# Patient Record
Sex: MALE | Race: WHITE | NOT HISPANIC OR LATINO | Employment: FULL TIME | URBAN - METROPOLITAN AREA
[De-identification: names, ages, dates, MRNs, and addresses within clinical notes are randomized per-mention and may not be internally consistent; named-entity substitution may affect disease eponyms.]

---

## 2017-02-06 ENCOUNTER — OFFICE VISIT (OUTPATIENT)
Dept: PULMONOLOGY | Facility: CLINIC | Age: 57
End: 2017-02-06

## 2017-02-06 VITALS
HEART RATE: 84 BPM | TEMPERATURE: 98.2 F | WEIGHT: 226.8 LBS | RESPIRATION RATE: 16 BRPM | OXYGEN SATURATION: 98 % | DIASTOLIC BLOOD PRESSURE: 78 MMHG | SYSTOLIC BLOOD PRESSURE: 118 MMHG | HEIGHT: 73 IN | BODY MASS INDEX: 30.06 KG/M2

## 2017-02-06 DIAGNOSIS — J45.909 UNCOMPLICATED ASTHMA, UNSPECIFIED ASTHMA SEVERITY: Primary | ICD-10-CM

## 2017-02-06 DIAGNOSIS — Z78.9 NONSMOKER: ICD-10-CM

## 2017-02-06 PROBLEM — J45.20 MILD INTERMITTENT ASTHMA: Status: ACTIVE | Noted: 2017-02-06

## 2017-02-06 PROCEDURE — 94375 RESPIRATORY FLOW VOLUME LOOP: CPT | Performed by: NURSE PRACTITIONER

## 2017-02-06 PROCEDURE — 99204 OFFICE O/P NEW MOD 45 MIN: CPT | Performed by: NURSE PRACTITIONER

## 2017-02-06 PROCEDURE — 94729 DIFFUSING CAPACITY: CPT | Performed by: NURSE PRACTITIONER

## 2017-02-06 PROCEDURE — 94726 PLETHYSMOGRAPHY LUNG VOLUMES: CPT | Performed by: NURSE PRACTITIONER

## 2017-02-06 RX ORDER — FLUTICASONE PROPIONATE 220 UG/1
2 AEROSOL, METERED RESPIRATORY (INHALATION)
Qty: 1 INHALER | Refills: 3 | Status: SHIPPED | OUTPATIENT
Start: 2017-02-06 | End: 2017-03-08

## 2017-02-06 NOTE — PROGRESS NOTES
[facility] Pulmonary Follow up    CHIEF COMPLAINT    History of asthma cough and congestion    HISTORY OF PRESENT ILLNESS    Mohamud Choe is a 56 y.o.male here today for referral for cough and congestion.  He was last seen in the office about 4 years ago by Dr. Lopez.  He is a history of intermittent asthma, and was off of all of the inhalers when he was last here.  He was diagnosed with asthma late in life around 1979, because of all the frequent bronchitis he had he was finally referred and saw Dr. Lopez, she started him on Flovent 220 which he used 2 puffs daily, he was eventually controlled on just Flovent 44 HFA 1 puff daily.    He stopped using any inhalers until early 2016 when he developed symptoms of cough congestion.  Chest tightness.  He took a Z-Héctor, had another round of antibiotic but did not improve until he started using pro air.  He had an old prescription of Flovent 44 and over the summer he started himself back on the inhaler.  This did help him.  For that reason he is back here today.    He denies sinus drainage or any significant history of allergies.  He denies a history of reflux or aspiration.  He has Pro Air that he started using early last year and this works very well for him especially when he has chest tightness and shortness of breath.  It only lasts a couple hours.  He is currently using 1-2 puffs 3-4 times a day.  Again it still helps but he is here for Flovent prescription.    He denies fevers chills or night sweats.  He has a good appetite as well as been stable.  He has no hospitalizations or emergency room visits.  He doesn't like to take the flu shot.  He does follow along closely with his PCP.  He has no history of sleep apnea.  He has remained active    He is a lifelong nonsmoker.  He started having trouble with cough and congestion, February and March of last year.        Patient Active Problem List   Diagnosis   • Mild intermittent asthma   • Nonsmoker  "      Allergies no known allergies    Current Outpatient Prescriptions:   •  aspirin 81 MG tablet, Take 81 mg by mouth Daily., Disp: , Rfl:   •  PROAIR  (90 BASE) MCG/ACT inhaler, INL 2 PFS PO QID PRN, Disp: , Rfl: 3  •  fluticasone (FLOVENT HFA) 220 MCG/ACT inhaler, Inhale 2 puffs 2 (Two) Times a Day for 30 days., Disp: 1 inhaler, Rfl: 3  MEDICATION LIST AND ALLERGIES REVIEWED.    Social History   Substance Use Topics   • Smoking status: Never Smoker   • Smokeless tobacco: Not on file   • Alcohol use Yes      Comment: [place] or beer, one drink a week       FAMILY AND SOCIAL HISTORY REVIEWED.    Review of Systems   Constitutional: Negative for chills, fatigue and fever.   HENT: Negative for nosebleeds, postnasal drip and sore throat.    Eyes: Negative.  Negative for pain and redness.   Respiratory: Positive for chest tightness and shortness of breath. Negative for wheezing and stridor.    Cardiovascular: Negative for chest pain and palpitations.   Gastrointestinal: Negative for abdominal distention, abdominal pain and nausea.   Endocrine: Negative for cold intolerance and heat intolerance.   Genitourinary: Negative for dysuria, flank pain and hematuria.   Musculoskeletal: Negative for arthralgias and myalgias.   Skin: Negative for color change and rash.   Neurological: Negative for dizziness, syncope and numbness.   Hematological: Negative for adenopathy. Does not bruise/bleed easily.   Psychiatric/Behavioral: Negative for agitation, behavioral problems, confusion and sleep disturbance.   .    Visit Vitals   • /78   • Pulse 84   • Temp 98.2 °F (36.8 °C)   • Resp 16   • Ht 73\" (185.4 cm)   • Wt 226 lb 12.8 oz (103 kg)   • SpO2 98%  Comment: RA   • BMI 29.92 kg/m2     Physical Exam   Constitutional: He is oriented to person, place, and time. Vital signs are normal. He appears well-developed. He is cooperative.  Non-toxic appearance. No distress.   HENT:   Head: Normocephalic. Head is without abrasion and " without contusion.   Mouth/Throat: Oropharynx is clear and moist and mucous membranes are normal.   Eyes: Conjunctivae are normal. Pupils are equal, round, and reactive to light.   Neck: Trachea normal and normal range of motion. No JVD present. No tracheal deviation present. No thyroid mass and no thyromegaly present.   Cardiovascular: Normal rate, regular rhythm and normal heart sounds.  Exam reveals no gallop.    No murmur heard.  No edema cyanosis or calf tenderness   Pulmonary/Chest: Effort normal. No stridor. No respiratory distress. He has no wheezes. He has no rales.   Clear and equal   Abdominal: Soft. He exhibits no ascites. There is no hepatosplenomegaly. There is no tenderness.   Lymphadenopathy:        Head (right side): No submandibular adenopathy present.        Head (left side): No submandibular adenopathy present.     He has no cervical adenopathy.        Right: No supraclavicular adenopathy present.        Left: No supraclavicular adenopathy present.   Neurological: He is alert and oriented to person, place, and time. He has normal strength.   Skin: Skin is warm and dry. No abrasion and no rash noted.   Psychiatric: He has a normal mood and affect. His speech is normal and behavior is normal. Cognition and memory are normal.       RESULTS    Chest x-ray PA and lateral obtained in the office today reveals no acute infiltrates or effusions, costophrenic angles sharp, no other films to compare.    Pulmicort function testing reveals no restriction, total lung capacity normal at 94%.  Diffusion normal at 106%.  Minute ventilation normal, no obstruction, ratio is 76%.  FEV1 3.74 L, 91%.  FVC 4.95 L, 92%.    PROBLEM LIST    Problem List Items Addressed This Visit        Other    Nonsmoker      Other Visit Diagnoses     Uncomplicated asthma, unspecified asthma severity    -  Primary    Relevant Medications    PROAIR  (90 BASE) MCG/ACT inhaler    fluticasone (FLOVENT HFA) 220 MCG/ACT inhaler    Other  Relevant Orders    XR Chest PA & Lateral (Completed)    Pulmonary Function Test (Completed)            DISCUSSION    A prescription was sent for Flovent 220, 2 puffs twice a day for couple weeks to get his symptoms under control, then when he notices he is improved and has been able to decrease pro air use Cetaphil the lower doses of Flovent, I gave him prescriptions for those as well.    Follow-up in 6 months, The patient was told to call and given extensions 114 if needed for an earlier visit if problems arise prior to the scheduled followup.      Marbella Enrique, APRN  02/06/20179:18 AM  Electronically signed     Please note that portions of this note were completed with a voice recognition program. Efforts were made to edit the dictations, but occasionally words are mistranscribed.      CC: Umang Lopez MD

## 2017-10-11 ENCOUNTER — OFFICE VISIT (OUTPATIENT)
Dept: PULMONOLOGY | Facility: CLINIC | Age: 57
End: 2017-10-11

## 2017-10-11 VITALS
RESPIRATION RATE: 16 BRPM | HEIGHT: 73 IN | DIASTOLIC BLOOD PRESSURE: 80 MMHG | SYSTOLIC BLOOD PRESSURE: 125 MMHG | OXYGEN SATURATION: 96 % | BODY MASS INDEX: 30.17 KG/M2 | WEIGHT: 227.6 LBS | HEART RATE: 70 BPM | TEMPERATURE: 97.8 F

## 2017-10-11 DIAGNOSIS — J45.20 MILD INTERMITTENT ASTHMA WITHOUT COMPLICATION: ICD-10-CM

## 2017-10-11 DIAGNOSIS — Z78.9 NONSMOKER: Primary | ICD-10-CM

## 2017-10-11 PROCEDURE — 99213 OFFICE O/P EST LOW 20 MIN: CPT | Performed by: NURSE PRACTITIONER

## 2017-10-11 RX ORDER — FLUTICASONE PROPIONATE 44 MCG
AEROSOL WITH ADAPTER (GRAM) INHALATION
Refills: 11 | COMMUNITY
Start: 2017-08-23 | End: 2019-05-21 | Stop reason: SDUPTHER

## 2017-10-11 NOTE — PROGRESS NOTES
[facility] Pulmonary Follow up    CHIEF COMPLAINT    FU asthma    HISTORY OF PRESENT ILLNESS    Mohamud Choe is a 57 y.o.male here today for asthma. He was last here about 6 months ago. He stopped using any inhalers until early 2016 when he developed symptoms of cough congestion, and chest tightness.  He took a Z-Héctor, had another round of antibiotic but did not improve until he started using pro air.  He had an old prescription of Flovent 44 and over the summer he started himself back on the inhaler.  This did help him.      He denies sinus drainage or any significant history of allergies.  He denies a history of reflux or aspiration.  He has Pro Air that he started using early last year and this works very well for him especially when he has chest tightness and shortness of breath. He has not had to use it, denies sob, no cough, and remains on flovent 44 mcg; he will use it 1-2 puffs 1-2 times per day.     He denies fevers chills or night sweats.  He has a good appetite as well as been stable.  He has no hospitalizations or emergency room visits.  He doesn't like to take the flu shot.  He does follow along closely with his PCP.  He has no history of sleep apnea.  He has remained active    He is a lifelong nonsmoker.         Patient Active Problem List   Diagnosis   • Mild intermittent asthma   • Nonsmoker       No Known Allergies    Current Outpatient Prescriptions:   •  aspirin 81 MG tablet, Take 81 mg by mouth Daily., Disp: , Rfl:   •  FLOVENT HFA 44 MCG/ACT inhaler, INHALE 2 PUFFS PO QAM AND  2 PUFFS PO QPM, Disp: , Rfl: 11  •  PROAIR  (90 BASE) MCG/ACT inhaler, INL 2 PFS PO QID PRN, Disp: , Rfl: 3  MEDICATION LIST AND ALLERGIES REVIEWED.    Social History   Substance Use Topics   • Smoking status: Never Smoker   • Smokeless tobacco: Not on file   • Alcohol use Yes      Comment: [place] or beer, one drink a week       FAMILY AND SOCIAL HISTORY REVIEWED.    Review of Systems   Constitutional: Negative for  "activity change, chills, fatigue and fever.   HENT: Negative for hearing loss, nosebleeds, postnasal drip and sneezing.    Respiratory: Negative for apnea, cough, chest tightness, shortness of breath, wheezing and stridor.    Cardiovascular: Negative for chest pain, palpitations and leg swelling.        NO INCREASE IN MILD BLE EDEMA; NO CALF TENDERNESS    Gastrointestinal: Negative for abdominal pain, diarrhea and nausea.   Neurological: Negative for dizziness, syncope and light-headedness.   .    /80  Pulse 70  Temp 97.8 °F (36.6 °C)  Resp 16  Ht 73\" (185.4 cm)  Wt 227 lb 9.6 oz (103 kg)  SpO2 96% Comment: RA  BMI 30.03 kg/m2  Physical Exam   Constitutional: He is oriented to person, place, and time. He appears well-developed. No distress.   HENT:   Head: Normocephalic.   Eyes: Pupils are equal, round, and reactive to light.   Neck: No JVD present. No thyromegaly present.   Cardiovascular: Normal rate, regular rhythm and normal heart sounds.  Exam reveals no friction rub.    No BLE edema; no venous cords or calf tenderness.    Pulmonary/Chest: Effort normal. No stridor. No respiratory distress. He has no wheezes. He has no rales. He exhibits no tenderness.   Lymphadenopathy:     He has no cervical adenopathy.   Neurological: He is alert and oriented to person, place, and time.   Skin: He is not diaphoretic.   Psychiatric: He has a normal mood and affect. His behavior is normal. Thought content normal.         PROBLEM LIST    Problem List Items Addressed This Visit        Respiratory    Mild intermittent asthma    Overview     Vital capacity is 4.99 or 91%, FEV1 os 4.07 pr 96%, ratio is 82%. This is normal study 03/04/13         Relevant Medications    FLOVENT HFA 44 MCG/ACT inhaler       Other    Nonsmoker - Primary            DISCUSSION    He doesn't want the flu shot and never takes them    I gave him a written prescription for Flovent 44, 2 puffs a.m. and p.m. rinse and spit after use and he'll " remain on this for now she is done well with the lower dose    Follow-up in one year with spirometry, he was told to call us if he needs to be seen prior to that time    Marbella Enrique, APRN  10/11/70113:24 AM  Electronically signed     Please note that portions of this note were completed with a voice recognition program. Efforts were made to edit the dictations, but occasionally words are mistranscribed.      CC: Umang Lopez MD

## 2019-05-21 ENCOUNTER — OFFICE VISIT (OUTPATIENT)
Dept: PULMONOLOGY | Facility: CLINIC | Age: 59
End: 2019-05-21

## 2019-05-21 VITALS
SYSTOLIC BLOOD PRESSURE: 142 MMHG | WEIGHT: 225.6 LBS | HEIGHT: 73 IN | DIASTOLIC BLOOD PRESSURE: 89 MMHG | OXYGEN SATURATION: 96 % | BODY MASS INDEX: 29.9 KG/M2 | TEMPERATURE: 98.4 F | HEART RATE: 59 BPM

## 2019-05-21 DIAGNOSIS — Z78.9 NONSMOKER: ICD-10-CM

## 2019-05-21 DIAGNOSIS — J45.20 MILD INTERMITTENT ASTHMA WITHOUT COMPLICATION: Primary | ICD-10-CM

## 2019-05-21 PROCEDURE — 94729 DIFFUSING CAPACITY: CPT | Performed by: NURSE PRACTITIONER

## 2019-05-21 PROCEDURE — 94375 RESPIRATORY FLOW VOLUME LOOP: CPT | Performed by: NURSE PRACTITIONER

## 2019-05-21 PROCEDURE — 99213 OFFICE O/P EST LOW 20 MIN: CPT | Performed by: NURSE PRACTITIONER

## 2019-05-21 PROCEDURE — 94726 PLETHYSMOGRAPHY LUNG VOLUMES: CPT | Performed by: NURSE PRACTITIONER

## 2019-05-21 RX ORDER — FLUTICASONE PROPIONATE 44 MCG
2 AEROSOL WITH ADAPTER (GRAM) INHALATION
Qty: 10.6 G | Refills: 11 | Status: SHIPPED | OUTPATIENT
Start: 2019-05-21 | End: 2020-10-01 | Stop reason: SDUPTHER

## 2019-05-21 NOTE — PROGRESS NOTES
[facility] Pulmonary Follow up    CHIEF COMPLAINT    Mild intermittent asthma    HISTORY OF PRESENT ILLNESS    Mohamud Choe is a 58 y.o.male here today for a yearly checkup of his mild intermittent asthma.  He denies any respiratory illnesses or exacerbations since his last appointment.  He states that he has been doing pretty well from a respiratory standpoint.    He continues to use Flovent 2 puffs every morning.  He has never used his rescue inhaler.  He does have occasional wheezes with activity.  He denies any shortness of breath with activity.    He denies fever, chills, sputum production, hemoptysis, night sweats, weight loss, chest pain or palpitations.  He denies lower extremity edema.  He denies any difficulties with allergies or sinus symptoms.  He denies reflux symptoms.  He denies any difficulty sleeping.  He states he feels well rested in the mornings.    He is a lifetime non-smoker.  He denies any changes in his medical history since his last appointment.    Patient Active Problem List   Diagnosis   • Mild intermittent asthma   • Nonsmoker       No Known Allergies    Current Outpatient Medications:   •  aspirin 81 MG tablet, Take 81 mg by mouth Daily., Disp: , Rfl:   •  FLOVENT HFA 44 MCG/ACT inhaler, Inhale 2 puffs 2 (Two) Times a Day., Disp: 10.6 g, Rfl: 11  •  PROAIR  (90 BASE) MCG/ACT inhaler, INL 2 PFS PO QID PRN, Disp: , Rfl: 3  MEDICATION LIST AND ALLERGIES REVIEWED.    Social History     Tobacco Use   • Smoking status: Never Smoker   Substance Use Topics   • Alcohol use: Yes     Comment: [place] or beer, one drink a week   • Drug use: No       FAMILY AND SOCIAL HISTORY REVIEWED.    Review of Systems   Constitutional: Negative for activity change, appetite change, fatigue, fever and unexpected weight change.   HENT: Negative for congestion, postnasal drip, rhinorrhea, sinus pressure, sore throat and voice change.    Eyes: Negative for visual disturbance.   Respiratory: Negative for  "cough, chest tightness, shortness of breath and wheezing.    Cardiovascular: Negative for chest pain, palpitations and leg swelling.   Gastrointestinal: Negative for abdominal distention, abdominal pain, nausea and vomiting.   Endocrine: Negative for cold intolerance and heat intolerance.   Genitourinary: Negative for difficulty urinating and urgency.   Musculoskeletal: Positive for arthralgias. Negative for back pain and neck pain.   Skin: Negative for color change and pallor.   Allergic/Immunologic: Negative for environmental allergies and food allergies.   Neurological: Negative for dizziness, syncope, weakness and light-headedness.   Hematological: Negative for adenopathy. Does not bruise/bleed easily.   Psychiatric/Behavioral: Negative for agitation and behavioral problems.   .    /89 (BP Location: Right arm, Patient Position: Sitting)   Pulse 59   Temp 98.4 °F (36.9 °C)   Ht 185.4 cm (73\")   Wt 102 kg (225 lb 9.6 oz)   SpO2 96% Comment: resting at room air  BMI 29.76 kg/m²        There is no immunization history on file for this patient.    Physical Exam   Constitutional: He is oriented to person, place, and time. He appears well-developed and well-nourished.   HENT:   Head: Normocephalic and atraumatic.   Eyes: Pupils are equal, round, and reactive to light.   Neck: Normal range of motion. Neck supple. No thyromegaly present.   Cardiovascular: Normal rate, regular rhythm, normal heart sounds and intact distal pulses. Exam reveals no gallop and no friction rub.   No murmur heard.  Pulmonary/Chest: Effort normal and breath sounds normal. No respiratory distress. He has no wheezes. He has no rales. He exhibits no tenderness.   Abdominal: Soft. Bowel sounds are normal. There is no tenderness.   Musculoskeletal: Normal range of motion.   Lymphadenopathy:     He has no cervical adenopathy.   Neurological: He is alert and oriented to person, place, and time.   Skin: Skin is warm and dry. Capillary refill " takes less than 2 seconds. He is not diaphoretic.   Psychiatric: He has a normal mood and affect. His behavior is normal.   Nursing note and vitals reviewed.        RESULTS    PFTS in the office today, read by me.    Spirometry Interpretation 05/21/19:    1. No airway obstruction as the FEV1 ratio >= 0.7.  2. The maximum voluntary ventilation (MVV) is normal.   3. TLC 6.31 85% predicted, no restriction noted  4. Normal diffusion at 101% predicted    Chest PA/lateral: Reviewed by me in the office today, appears to have no acute pulmonary process similar when compared to previous exam, official report pending.    PROBLEM LIST    Problem List Items Addressed This Visit        Respiratory    Mild intermittent asthma - Primary    Overview     Vital capacity is 4.99 or 91%, FEV1 os 4.07 pr 96%, ratio is 82%. This is normal study 03/04/13         Relevant Medications    FLOVENT HFA 44 MCG/ACT inhaler    Other Relevant Orders    Pulmonary Function Test (Completed)    XR Chest PA & Lateral       Other    Nonsmoker            DISCUSSION    Mr. Choe was here for follow-up of his mild intermittent asthma.  We reviewed his PFTs in detail today and he continues to have normal PFTs.  He will continue his Flovent 2 puffs daily for his mild intermittent asthma.  I sent in refills for this today.  I did encourage him to use his rescue inhaler if needed for shortness of breath.    We also reviewed his chest x-ray today in the office does not appear to have any acute process going on currently.    He seems to be doing well from a respiratory standpoint.  He has not had any exacerbations or illnesses in the last year.    He will follow-up yearly or sooner if his symptoms worsen.  I spent 15 minutes with the patient. I spent > 50% percent of this time counseling and discussing diagnosis, prognosis, diagnostic testing, evaluation, current status, treatment options and management.    Mesha Marc, APRN  05/21/20199:27  AM  Electronically signed     Please note that portions of this note were completed with a voice recognition program. Efforts were made to edit the dictations, but occasionally words are mistranscribed.      CC: Umang Lopez MD

## 2020-10-01 ENCOUNTER — OFFICE VISIT (OUTPATIENT)
Dept: PULMONOLOGY | Facility: CLINIC | Age: 60
End: 2020-10-01

## 2020-10-01 VITALS
BODY MASS INDEX: 29.29 KG/M2 | WEIGHT: 221 LBS | OXYGEN SATURATION: 96 % | TEMPERATURE: 97.5 F | SYSTOLIC BLOOD PRESSURE: 150 MMHG | DIASTOLIC BLOOD PRESSURE: 90 MMHG | HEIGHT: 73 IN | HEART RATE: 67 BPM

## 2020-10-01 DIAGNOSIS — J45.20 MILD INTERMITTENT ASTHMA WITHOUT COMPLICATION: ICD-10-CM

## 2020-10-01 DIAGNOSIS — Z78.9 NONSMOKER: ICD-10-CM

## 2020-10-01 DIAGNOSIS — J45.20 MILD INTERMITTENT ASTHMA, UNSPECIFIED WHETHER COMPLICATED: Primary | ICD-10-CM

## 2020-10-01 PROCEDURE — 99213 OFFICE O/P EST LOW 20 MIN: CPT | Performed by: NURSE PRACTITIONER

## 2020-10-01 RX ORDER — FLUTICASONE PROPIONATE 44 MCG
2 AEROSOL WITH ADAPTER (GRAM) INHALATION
Qty: 10.6 G | Refills: 11 | Status: SHIPPED | OUTPATIENT
Start: 2020-10-01 | End: 2021-10-04 | Stop reason: SDUPTHER

## 2020-10-01 NOTE — PROGRESS NOTES
[facility] Pulmonary Follow up    CHIEF COMPLAINT    Mild intermittent asthma    HISTORY OF PRESENT ILLNESS    Mohamud Choe is a 60 y.o.male here today for follow-up of his asthma.  He was last seen 1 year ago by me.  He denies any respiratory illnesses or exacerbations since his last appointment.    He states for the most part he has been doing fairly well from a pulmonary standpoint.  He continues to use his Flovent 2 puffs twice a day.  He denies any wheezing or shortness of breath.  He will have a's mild shortness of breath when climbing hills or heavy exertion but does recover quickly at rest.    He has a pro-air rescue inhaler that he likes to keep on hand but does not use that very frequently.  He thinks his inhaler is .    He denies a fever, chills, sputum production, hemoptysis, night sweats, weight loss, chest pain or palpitations.  He denies any lower extremity edema or calf tenderness.  He denies any sinus or allergy symptoms.  He denies reflux symptoms.    He refuses to receive the influenza vaccination.    He is a lifetime non-smoker.    Patient Active Problem List   Diagnosis   • Mild intermittent asthma   • Nonsmoker       No Known Allergies    Current Outpatient Medications:   •  aspirin 81 MG tablet, Take 81 mg by mouth Daily., Disp: , Rfl:   •  Flovent HFA 44 MCG/ACT inhaler, Inhale 2 puffs 2 (Two) Times a Day., Disp: 10.6 g, Rfl: 11  •  ProAir  (90 Base) MCG/ACT inhaler, Inhale 2 puffs Every 4 (Four) Hours As Needed for Wheezing or Shortness of Air., Disp: 6.7 g, Rfl: 3  MEDICATION LIST AND ALLERGIES REVIEWED.    Social History     Tobacco Use   • Smoking status: Never Smoker   Substance Use Topics   • Alcohol use: Yes     Comment: [place] or beer, one drink a week   • Drug use: No       FAMILY AND SOCIAL HISTORY REVIEWED.    Review of Systems   Constitutional: Negative for activity change, appetite change, fatigue, fever and unexpected weight change.   HENT: Negative for  "congestion, postnasal drip, rhinorrhea, sinus pressure, sore throat and voice change.    Eyes: Negative for visual disturbance.   Respiratory: Negative for cough, chest tightness, shortness of breath and wheezing.    Cardiovascular: Negative for chest pain, palpitations and leg swelling.   Gastrointestinal: Negative for abdominal distention, abdominal pain, nausea and vomiting.   Endocrine: Negative for cold intolerance and heat intolerance.   Genitourinary: Negative for difficulty urinating and urgency.   Musculoskeletal: Negative for arthralgias, back pain and neck pain.   Skin: Negative for color change and pallor.   Allergic/Immunologic: Negative for environmental allergies and food allergies.   Neurological: Negative for dizziness, syncope, weakness and light-headedness.   Hematological: Negative for adenopathy. Does not bruise/bleed easily.   Psychiatric/Behavioral: Negative for agitation and behavioral problems.   .    /90   Pulse 67   Temp 97.5 °F (36.4 °C)   Ht 185.4 cm (73\")   Wt 100 kg (221 lb)   SpO2 96% Comment: resting at eroom air  BMI 29.16 kg/m²       There is no immunization history on file for this patient.    Physical Exam  Vitals signs and nursing note reviewed.   Constitutional:       Appearance: He is well-developed. He is not diaphoretic.   HENT:      Head: Normocephalic and atraumatic.   Eyes:      Pupils: Pupils are equal, round, and reactive to light.   Neck:      Musculoskeletal: Normal range of motion and neck supple.      Thyroid: No thyromegaly.   Cardiovascular:      Rate and Rhythm: Normal rate and regular rhythm.      Heart sounds: Normal heart sounds. No murmur. No friction rub. No gallop.    Pulmonary:      Effort: Pulmonary effort is normal. No respiratory distress.      Breath sounds: Normal breath sounds. No wheezing or rales.   Chest:      Chest wall: No tenderness.   Abdominal:      General: Bowel sounds are normal.      Palpations: Abdomen is soft.      " Tenderness: There is no abdominal tenderness.   Musculoskeletal: Normal range of motion.         General: No swelling.   Lymphadenopathy:      Cervical: No cervical adenopathy.   Skin:     General: Skin is warm and dry.      Capillary Refill: Capillary refill takes less than 2 seconds.   Neurological:      Mental Status: He is alert and oriented to person, place, and time.   Psychiatric:         Behavior: Behavior normal.           RESULTS    Chest PA/lateral: Official report pending    PROBLEM LIST    Problem List Items Addressed This Visit        Respiratory    Mild intermittent asthma - Primary    Overview     Vital capacity is 4.99 or 91%, FEV1 os 4.07 pr 96%, ratio is 82%. This is normal study 03/04/13         Relevant Medications    Flovent HFA 44 MCG/ACT inhaler    ProAir  (90 Base) MCG/ACT inhaler    Other Relevant Orders    XR Chest PA & Lateral       Other    Nonsmoker            DISCUSSION    Mr. Choe was here for follow-up of his mild intermittent asthma.  He refused to do Anne testing and did not want to perform PFTs today.  He has not noticed any significant change in his breathing since his last appointment 1 year ago.  He will remain on Flovent 2 puffs twice a day for his asthma.  He will continue to use the pro-air as needed for shortness of breath or wheezing.  I did send in refills to his local pharmacy.    We did review his chest x-ray in the office today and it appears to have no acute pulmonary process however the official report is pending.  I will notify the patient if there are any abnormalities.    We did discuss the influenza vaccination in the office today and he refused to receive this.    He will follow-up in 1 year or sooner if his symptoms worsen.  He will call with any additional concerns or questions.  I spent 15 minutes with the patient. I spent > 50% percent of this time counseling and discussing diagnosis, prognosis, diagnostic testing, evaluation, current status,  treatment options and management.    Mesha TIM Marc, APRN  10/01/518482:54 EDT  Electronically signed     Please note that portions of this note were completed with a voice recognition program. Efforts were made to edit the dictations, but occasionally words are mistranscribed.      CC: Umang Lopez MD

## 2021-10-04 ENCOUNTER — OFFICE VISIT (OUTPATIENT)
Dept: PULMONOLOGY | Facility: CLINIC | Age: 61
End: 2021-10-04

## 2021-10-04 VITALS
WEIGHT: 224 LBS | SYSTOLIC BLOOD PRESSURE: 138 MMHG | OXYGEN SATURATION: 95 % | BODY MASS INDEX: 29.55 KG/M2 | TEMPERATURE: 97.9 F | DIASTOLIC BLOOD PRESSURE: 82 MMHG | HEART RATE: 80 BPM

## 2021-10-04 DIAGNOSIS — J45.20 MILD INTERMITTENT ASTHMA, UNSPECIFIED WHETHER COMPLICATED: Primary | ICD-10-CM

## 2021-10-04 DIAGNOSIS — J45.20 MILD INTERMITTENT ASTHMA WITHOUT COMPLICATION: ICD-10-CM

## 2021-10-04 PROCEDURE — 99214 OFFICE O/P EST MOD 30 MIN: CPT | Performed by: NURSE PRACTITIONER

## 2021-10-04 RX ORDER — FLUTICASONE PROPIONATE 44 MCG
2 AEROSOL WITH ADAPTER (GRAM) INHALATION
Qty: 10.6 G | Refills: 11 | Status: SHIPPED | OUTPATIENT
Start: 2021-10-04 | End: 2022-09-30 | Stop reason: SDUPTHER

## 2021-10-04 NOTE — PROGRESS NOTES
[facility] Pulmonary Follow up    CHIEF COMPLAINT    Asthma    HISTORY OF PRESENT ILLNESS    Mohamud Choe is a 61 y.o.male here today for follow-up of his asthma.  He was last seen 1 year ago by me.  He denies any respiratory illnesses or exacerbations since his last appointment.  He states that he is raising his granddaughter over the last year and is very active with her.    He continues to use his Flovent 2 puffs twice a day.  He rarely uses the proair rescue inhaler.  Has minimal shortness of breath with exertion and recovers fairly quickly at rest.    He denies any sleeping difficulties.    He denies a fever, chills, sputum production, hemoptysis, night sweats, weight loss, chest pain or palpitations.  He denies any lower extremity edema or calf tenderness.  He denies any sinus or allergy symptoms.  He denies reflux symptoms.     He refuses to receive the influenza vaccination.     He is a lifetime non-smoker.    Patient Active Problem List   Diagnosis   • Mild intermittent asthma   • Nonsmoker       No Known Allergies    Current Outpatient Medications:   •  aspirin 81 MG tablet, Take 81 mg by mouth Daily., Disp: , Rfl:   •  Flovent HFA 44 MCG/ACT inhaler, Inhale 2 puffs 2 (Two) Times a Day., Disp: 10.6 g, Rfl: 11  •  ProAir  (90 Base) MCG/ACT inhaler, Inhale 2 puffs Every 4 (Four) Hours As Needed for Wheezing or Shortness of Air., Disp: 6.7 g, Rfl: 3  MEDICATION LIST AND ALLERGIES REVIEWED.    Social History     Tobacco Use   • Smoking status: Never Smoker   Substance Use Topics   • Alcohol use: Yes     Comment: [place] or beer, one drink a week   • Drug use: No       FAMILY AND SOCIAL HISTORY REVIEWED.    Review of Systems   Constitutional: Negative for activity change, appetite change, fatigue, fever and unexpected weight change.   HENT: Negative for congestion, postnasal drip, rhinorrhea, sinus pressure, sore throat and voice change.    Eyes: Negative for visual disturbance.   Respiratory: Positive  for shortness of breath. Negative for cough, chest tightness and wheezing.    Cardiovascular: Negative for chest pain, palpitations and leg swelling.   Gastrointestinal: Negative for abdominal distention, abdominal pain, nausea and vomiting.   Endocrine: Negative for cold intolerance and heat intolerance.   Genitourinary: Negative for difficulty urinating and urgency.   Musculoskeletal: Negative for arthralgias, back pain and neck pain.   Skin: Negative for color change and pallor.   Allergic/Immunologic: Negative for environmental allergies and food allergies.   Neurological: Negative for dizziness, syncope, weakness and light-headedness.   Hematological: Negative for adenopathy. Does not bruise/bleed easily.   Psychiatric/Behavioral: Negative for agitation and behavioral problems.   .    /82   Pulse 80   Temp 97.9 °F (36.6 °C)   Wt 102 kg (224 lb)   SpO2 95% Comment: resting, room air  BMI 29.55 kg/m²     Immunization History   Administered Date(s) Administered   • COVID-19 (PFIZER) 03/17/2021, 04/10/2021       Physical Exam  Vitals and nursing note reviewed.   Constitutional:       Appearance: He is well-developed. He is not diaphoretic.   HENT:      Head: Normocephalic and atraumatic.   Eyes:      Pupils: Pupils are equal, round, and reactive to light.   Neck:      Thyroid: No thyromegaly.   Cardiovascular:      Rate and Rhythm: Normal rate and regular rhythm.      Heart sounds: Normal heart sounds. No murmur heard.   No friction rub. No gallop.    Pulmonary:      Effort: Pulmonary effort is normal. No respiratory distress.      Breath sounds: Normal breath sounds. No wheezing or rales.   Chest:      Chest wall: No tenderness.   Abdominal:      General: Bowel sounds are normal.      Palpations: Abdomen is soft.      Tenderness: There is no abdominal tenderness.   Musculoskeletal:         General: No swelling. Normal range of motion.      Cervical back: Normal range of motion and neck supple.    Lymphadenopathy:      Cervical: No cervical adenopathy.   Skin:     General: Skin is warm and dry.      Capillary Refill: Capillary refill takes less than 2 seconds.   Neurological:      Mental Status: He is alert and oriented to person, place, and time.   Psychiatric:         Behavior: Behavior normal.           RESULTS    XR Chest PA & Lateral    Result Date: 10/4/2021  No acute cardiopulmonary process.  D: 10/04/2021 E:  10/04/2021       PROBLEM LIST    Problem List Items Addressed This Visit        Pulmonary and Pneumonias    Mild intermittent asthma - Primary    Overview     Vital capacity is 4.99 or 91%, FEV1 os 4.07 pr 96%, ratio is 82%. This is normal study 03/04/13         Relevant Medications    Flovent HFA 44 MCG/ACT inhaler    ProAir  (90 Base) MCG/ACT inhaler    Other Relevant Orders    XR Chest PA & Lateral (Completed)            DISCUSSION    Mr. Choe was here for follow-up of his asthma.  He seems to be doing well from pulmonary standpoint.  We did review his chest x-ray in the office today and appears to have no acute pulmonary process.  I will notify him if there is any abnormality.    He will continue with Flovent twice a day.  I did encourage him to use his ProAir as needed for shortness of breath.    He will follow-up in 1 year or sooner if his symptoms worsen.  He will need full PFTs at his next appointment.    Level of service justified based on 36 minutes spent in patient care on this date of service including, but not limited to: preparing to see the patient, obtaining and/or reviewing history, performing medically appropriate examination, ordering tests/medicine/procedures, independently interpreting results, documenting clinical information in EHR, and counseling/education of patient/family/caregiver. (Level 4 30-39 minutes; Level 5 40-54 minutes)      Mesha Marc, ELLA  10/04/990675:26 EDT  Electronically signed     Please note that portions of this note were completed  with a voice recognition program. Efforts were made to edit the dictations, but occasionally words are mistranscribed.      CC: Umang Lopez MD

## 2022-09-30 ENCOUNTER — OFFICE VISIT (OUTPATIENT)
Dept: PULMONOLOGY | Facility: CLINIC | Age: 62
End: 2022-09-30

## 2022-09-30 VITALS
BODY MASS INDEX: 29.72 KG/M2 | RESPIRATION RATE: 16 BRPM | WEIGHT: 224.2 LBS | SYSTOLIC BLOOD PRESSURE: 140 MMHG | HEART RATE: 68 BPM | TEMPERATURE: 98.6 F | HEIGHT: 73 IN | DIASTOLIC BLOOD PRESSURE: 80 MMHG | OXYGEN SATURATION: 98 %

## 2022-09-30 DIAGNOSIS — J45.20 MILD INTERMITTENT ASTHMA WITHOUT COMPLICATION: ICD-10-CM

## 2022-09-30 DIAGNOSIS — J45.20 MILD INTERMITTENT ASTHMA, UNSPECIFIED WHETHER COMPLICATED: Primary | ICD-10-CM

## 2022-09-30 PROCEDURE — 94726 PLETHYSMOGRAPHY LUNG VOLUMES: CPT | Performed by: NURSE PRACTITIONER

## 2022-09-30 PROCEDURE — 94375 RESPIRATORY FLOW VOLUME LOOP: CPT | Performed by: NURSE PRACTITIONER

## 2022-09-30 PROCEDURE — 94729 DIFFUSING CAPACITY: CPT | Performed by: NURSE PRACTITIONER

## 2022-09-30 PROCEDURE — 99213 OFFICE O/P EST LOW 20 MIN: CPT | Performed by: NURSE PRACTITIONER

## 2022-09-30 RX ORDER — FLUTICASONE PROPIONATE 44 MCG
2 AEROSOL WITH ADAPTER (GRAM) INHALATION
Qty: 10.6 G | Refills: 11 | Status: SHIPPED | OUTPATIENT
Start: 2022-09-30

## 2022-09-30 NOTE — PROGRESS NOTES
[facility] Pulmonary Follow up    CHIEF COMPLAINT    No complaints    HISTORY OF PRESENT ILLNESS    Mohamud Choe is a 62 y.o.male here today for his yearly follow-up.  He denies any COVID-19 or any respiratory illnesses since his last appointment.    He continues to use his Flovent twice a day.  He has not used his albuterol in quite some time.  He denies any shortness of breath with exertion.  He denies any sleeping difficulties.    He denies any sputum production or hemoptysis.  He denies any chest pain or palpitations.  He denies any lower extremity edema or calf tenderness.  He will occasionally have reflux symptoms and takes Tums occasionally.  He does not use these maybe once a week.    He is up-to-date on his current vaccinations.    He states he is in the process of adopting his granddaughter and stays active with her.    Is a lifetime non-smoker    Patient Active Problem List   Diagnosis   • Mild intermittent asthma   • Nonsmoker       No Known Allergies    Current Outpatient Medications:   •  aspirin 81 MG tablet, Take 81 mg by mouth Daily., Disp: , Rfl:   •  Flovent HFA 44 MCG/ACT inhaler, Inhale 2 puffs 2 (Two) Times a Day., Disp: 10.6 g, Rfl: 11  •  ProAir  (90 Base) MCG/ACT inhaler, Inhale 2 puffs Every 4 (Four) Hours As Needed for Wheezing or Shortness of Air., Disp: 6.7 g, Rfl: 3  MEDICATION LIST AND ALLERGIES REVIEWED.    Social History     Tobacco Use   • Smoking status: Never Smoker   • Smokeless tobacco: Never Used   Vaping Use   • Vaping Use: Never used   Substance Use Topics   • Alcohol use: Yes     Comment: [place] or beer, one drink a week   • Drug use: No       FAMILY AND SOCIAL HISTORY REVIEWED.    Review of Systems   Constitutional: Negative for activity change, appetite change, fatigue, fever and unexpected weight change.   HENT: Negative for congestion, postnasal drip, rhinorrhea, sinus pressure, sore throat and voice change.    Eyes: Negative for visual disturbance.  "  Respiratory: Negative for cough, chest tightness, shortness of breath and wheezing.    Cardiovascular: Negative for chest pain, palpitations and leg swelling.   Gastrointestinal: Negative for abdominal distention, abdominal pain, nausea and vomiting.   Endocrine: Negative for cold intolerance and heat intolerance.   Genitourinary: Negative for difficulty urinating and urgency.   Musculoskeletal: Negative for arthralgias, back pain and neck pain.   Skin: Negative for color change and pallor.   Allergic/Immunologic: Negative for environmental allergies and food allergies.   Neurological: Negative for dizziness, syncope, weakness and light-headedness.   Hematological: Negative for adenopathy. Does not bruise/bleed easily.   Psychiatric/Behavioral: Negative for agitation and behavioral problems.   .    /80 (BP Location: Left arm, Patient Position: Sitting, Cuff Size: Adult)   Pulse 68   Temp 98.6 °F (37 °C) (Infrared)   Resp 16   Ht 185.4 cm (73\")   Wt 102 kg (224 lb 3.2 oz)   SpO2 98%   BMI 29.58 kg/m²     Immunization History   Administered Date(s) Administered   • COVID-19 (PFIZER) BIVALENT BOOSTER 12+YRS 09/24/2022   • COVID-19 (PFIZER) PURPLE CAP 03/17/2021, 04/10/2021, 10/23/2021   • Covid-19 (Pfizer) Gray Cap 05/01/2022       Physical Exam  Vitals and nursing note reviewed.   Constitutional:       Appearance: He is well-developed. He is not diaphoretic.   HENT:      Head: Normocephalic and atraumatic.   Eyes:      Pupils: Pupils are equal, round, and reactive to light.   Neck:      Thyroid: No thyromegaly.   Cardiovascular:      Rate and Rhythm: Normal rate and regular rhythm.      Heart sounds: Normal heart sounds. No murmur heard.    No friction rub. No gallop.   Pulmonary:      Effort: Pulmonary effort is normal. No respiratory distress.      Breath sounds: Normal breath sounds. No wheezing or rales.   Chest:      Chest wall: No tenderness.   Abdominal:      General: Bowel sounds are normal.     "  Palpations: Abdomen is soft.      Tenderness: There is no abdominal tenderness.   Musculoskeletal:         General: No swelling. Normal range of motion.      Cervical back: Normal range of motion and neck supple.   Lymphadenopathy:      Cervical: No cervical adenopathy.   Skin:     General: Skin is warm and dry.      Capillary Refill: Capillary refill takes less than 2 seconds.   Neurological:      Mental Status: He is alert and oriented to person, place, and time.   Psychiatric:         Mood and Affect: Mood normal.         Behavior: Behavior normal.           RESULTS    PFTS in the office today, read by me, FVC 5.24 101% predicted, FEV1 4.13 106% predicted, FEV1/FVC 79% predicted, TLC 6.99 95% predicted, DLCO 119% predicted, no obstruction, no restriction and normal diffusion.    Chest PA/lateral: Official report pending    PROBLEM LIST    Problem List Items Addressed This Visit        Pulmonary and Pneumonias    Mild intermittent asthma - Primary    Overview     Vital capacity is 4.99 or 91%, FEV1 os 4.07 pr 96%, ratio is 82%. This is normal study 03/04/13         Relevant Medications    Flovent HFA 44 MCG/ACT inhaler    Other Relevant Orders    XR Chest PA & Lateral    Pulmonary Function Test (Completed)            DISCUSSION    Mr. Choe was here for his yearly follow-up visit.  He seems to be doing well from a pulmonary standpoint.  We reviewed his full PFTs in the office today and he has no obstruction or restriction.  He will continue taking his Flovent twice a day for his asthma.  I did encourage him to continue using the albuterol as needed for shortness of breath.    We did review his chest x-ray in the office today and the official report is pending.  We will notify him of any abnormalities if there are any.    Did encourage him to stay physically active is much as possible.    He will follow-up in 1 year with full PFTs and chest x-ray or sooner if his symptoms worsen.  Advised him to call with any  additional concerns or questions    Level of service justified based on 25 minutes spent in patient care on this date of service including, but not limited to: preparing to see the patient, obtaining and/or reviewing history, performing medically appropriate examination, ordering tests/medicine/procedures, independently interpreting results, documenting clinical information in EHR, and counseling/education of patient/family/caregiver. (Level 4 30-39 minutes; Level 5 40-54 minutes)      Mesha Marc, APRN  09/30/202209:38 EDT  Electronically signed     Please note that portions of this note were completed with a voice recognition program.        CC: Umang Lopez MD

## 2023-10-20 DIAGNOSIS — J45.20 MILD INTERMITTENT ASTHMA WITHOUT COMPLICATION: ICD-10-CM

## 2023-10-20 RX ORDER — FLUTICASONE PROPIONATE 44 MCG
2 AEROSOL WITH ADAPTER (GRAM) INHALATION 2 TIMES DAILY
Qty: 10.6 G | Refills: 0 | Status: SHIPPED | OUTPATIENT
Start: 2023-10-20

## 2024-08-22 ENCOUNTER — OFFICE VISIT (OUTPATIENT)
Age: 64
End: 2024-08-22
Payer: COMMERCIAL

## 2024-08-22 VITALS
HEART RATE: 90 BPM | DIASTOLIC BLOOD PRESSURE: 92 MMHG | SYSTOLIC BLOOD PRESSURE: 138 MMHG | WEIGHT: 219.6 LBS | BODY MASS INDEX: 29.1 KG/M2 | OXYGEN SATURATION: 97 % | TEMPERATURE: 97.8 F | HEIGHT: 73 IN

## 2024-08-22 DIAGNOSIS — J45.20 MILD INTERMITTENT ASTHMA WITHOUT COMPLICATION: ICD-10-CM

## 2024-08-22 DIAGNOSIS — J45.20 MILD INTERMITTENT ASTHMA, UNSPECIFIED WHETHER COMPLICATED: Primary | ICD-10-CM

## 2024-08-22 RX ORDER — FLUTICASONE PROPIONATE 44 MCG
2 AEROSOL WITH ADAPTER (GRAM) INHALATION 2 TIMES DAILY
Qty: 10.6 G | Refills: 11 | Status: SHIPPED | OUTPATIENT
Start: 2024-08-22

## 2024-08-22 NOTE — PROGRESS NOTES
[facility] Pulmonary Follow up    CHIEF COMPLAINT    No complaints    HISTORY OF PRESENT ILLNESS    Mohamud Choe is a 64 y.o.male here today for follow-up.  He was last seen 2 years ago by me.  He denies any  respiratory illnesses since last appointment.    He has been followed in our office for mild intermittent asthma.  He is using Flovent once a day and has done well with this regimen.  He does need some refills.    He has not uses his albuterol since his last appointment.    He denies any chest pain or palpitations.  Denies any lower extremity edema or calf tenderness.    He denies any sputum production or hemoptysis.  Denies any reflux symptoms.    He denies any sinus or allergy symptoms.    He is a lifetime non-smoker.    He has officially adopted his daughter and is now in the process of adopting a son.    Patient Active Problem List   Diagnosis    Mild intermittent asthma    Nonsmoker       No Known Allergies    Current Outpatient Medications:     aspirin 81 MG tablet, Take 1 tablet by mouth Daily., Disp: , Rfl:     Flovent HFA 44 MCG/ACT inhaler, Inhale 2 puffs 2 (Two) Times a Day., Disp: 10.6 g, Rfl: 11    ProAir  (90 Base) MCG/ACT inhaler, Inhale 2 puffs Every 4 (Four) Hours As Needed for Wheezing or Shortness of Air., Disp: 6.7 g, Rfl: 3  MEDICATION LIST AND ALLERGIES REVIEWED.    Social History     Tobacco Use    Smoking status: Never    Smokeless tobacco: Never   Vaping Use    Vaping status: Never Used   Substance Use Topics    Alcohol use: Yes     Comment: [place] or beer, one drink a week    Drug use: No       FAMILY AND SOCIAL HISTORY REVIEWED.    Review of Systems   Constitutional:  Negative for activity change, appetite change, fatigue, fever and unexpected weight change.   HENT:  Negative for congestion, postnasal drip, rhinorrhea, sinus pressure, sore throat and voice change.    Eyes:  Negative for visual disturbance.   Respiratory:  Negative for cough, chest tightness, shortness of  "breath and wheezing.    Cardiovascular:  Negative for chest pain, palpitations and leg swelling.   Gastrointestinal:  Negative for abdominal distention, abdominal pain, nausea and vomiting.   Endocrine: Negative for cold intolerance and heat intolerance.   Genitourinary:  Negative for difficulty urinating and urgency.   Musculoskeletal:  Negative for arthralgias, back pain and neck pain.   Skin:  Negative for color change and pallor.   Allergic/Immunologic: Negative for environmental allergies and food allergies.   Neurological:  Negative for dizziness, syncope, weakness and light-headedness.   Hematological:  Negative for adenopathy. Does not bruise/bleed easily.   Psychiatric/Behavioral:  Negative for agitation and behavioral problems.    .    /92   Pulse 90   Temp 97.8 °F (36.6 °C)   Ht 185.4 cm (73\")   Wt 99.6 kg (219 lb 9.6 oz)   SpO2 97% Comment: Room air at rest  BMI 28.97 kg/m²     Immunization History   Administered Date(s) Administered    COVID-19 (PFIZER) BIVALENT 12+YRS 09/24/2022    COVID-19 (PFIZER) Purple Cap Monovalent 03/17/2021, 04/10/2021, 10/23/2021    COVID-19 F23 (PFIZER) 12YRS+ (COMIRNATY) 01/06/2024    Covid-19 (Pfizer) Gray Cap Monovalent 05/01/2022       Physical Exam  Vitals and nursing note reviewed.   Constitutional:       Appearance: He is well-developed. He is not diaphoretic.   HENT:      Head: Normocephalic and atraumatic.   Eyes:      Pupils: Pupils are equal, round, and reactive to light.   Neck:      Thyroid: No thyromegaly.   Cardiovascular:      Rate and Rhythm: Normal rate and regular rhythm.      Heart sounds: Normal heart sounds. No murmur heard.     No friction rub. No gallop.   Pulmonary:      Effort: Pulmonary effort is normal. No respiratory distress.      Breath sounds: Normal breath sounds. No wheezing or rales.   Chest:      Chest wall: No tenderness.   Abdominal:      General: Bowel sounds are normal.      Palpations: Abdomen is soft.      Tenderness: " There is no abdominal tenderness.   Musculoskeletal:         General: No swelling. Normal range of motion.      Cervical back: Normal range of motion and neck supple.   Lymphadenopathy:      Cervical: No cervical adenopathy.   Skin:     General: Skin is warm and dry.      Capillary Refill: Capillary refill takes less than 2 seconds.   Neurological:      Mental Status: He is alert and oriented to person, place, and time.   Psychiatric:         Mood and Affect: Mood normal.         Behavior: Behavior normal.           RESULTS    Spirometry Interpretation: FVC 4.65 94% predicted, FEV1 3.76 100% predicted, FEV1/FVC 81% predicted, TLC 7.16 90% predicted, DLCO 109% predicted, no obstruction, no restriction and normal diffusion.    XR Chest PA & Lateral    Result Date: 8/22/2024  Impression: 1.No acute radiographic abnormality is identified. Electronically Signed: Serafin Jimenez MD  8/22/2024 9:21 AM EDT  Workstation ID: [id number]       PROBLEM LIST    Problem List Items Addressed This Visit          Pulmonary and Pneumonias    Mild intermittent asthma - Primary    Overview     Vital capacity is 4.99 or 91%, FEV1 os 4.07 pr 96%, ratio is 82%. This is normal study 03/04/13         Relevant Medications    Flovent HFA 44 MCG/ACT inhaler    Other Relevant Orders    Spirometry with Diffusion Capacity & Lung Volumes (Completed)    XR Chest PA & Lateral (Completed)         DISCUSSION    Mr. Choe was here for follow-up.  He seems to doing great from a pulmonary standpoint.  We did review his PFTs in the office today he has no obstruction or restriction.  He will continue to use his Flovent daily.  I did encourage him to use albuterol if needed for shortness of breath or wheezing.    I will refill his inhalers today.    We reviewed his chest x-ray in the office today and there is no acute abnormality.    I did encourage him to stay physically active and try to get at least 15 minutes of exercise daily.  He does stay active with  his grandchildren.    We will have him follow-up in 1 year.  I personally spent a total of 31 minutes on patient visit today including chart review, face to face with the patient obtaining the history and physical exam, review of pertinent images and tests, counseling and discussion and/or coordination of care as described above, and documentation.  Total time excludes time spent on other separate services such as performing procedures or test interpretation, if applicable.        Mesha Marc, APRN  08/22/202409:35 EDT  Electronically signed     Please note that portions of this note were completed with a voice recognition program.        CC: Umang Lopez MD

## 2024-08-27 ENCOUNTER — TELEPHONE (OUTPATIENT)
Age: 64
End: 2024-08-27
Payer: COMMERCIAL

## 2024-08-27 DIAGNOSIS — J45.20 MILD INTERMITTENT ASTHMA WITHOUT COMPLICATION: ICD-10-CM

## 2024-08-27 DIAGNOSIS — J45.20 MILD INTERMITTENT ASTHMA, UNSPECIFIED WHETHER COMPLICATED: Primary | ICD-10-CM

## 2024-08-27 RX ORDER — FLUTICASONE PROPIONATE 44 UG/1
1 AEROSOL, METERED RESPIRATORY (INHALATION)
Qty: 10.6 G | Refills: 11 | Status: SHIPPED | OUTPATIENT
Start: 2024-08-27 | End: 2024-08-30

## 2024-09-30 DIAGNOSIS — J45.20 MILD INTERMITTENT ASTHMA, UNSPECIFIED WHETHER COMPLICATED: ICD-10-CM

## 2024-09-30 DIAGNOSIS — J45.20 MILD INTERMITTENT ASTHMA WITHOUT COMPLICATION: ICD-10-CM

## 2024-09-30 RX ORDER — FLUTICASONE PROPIONATE AND SALMETEROL XINAFOATE 45; 21 UG/1; UG/1
2 AEROSOL, METERED RESPIRATORY (INHALATION)
Qty: 12 G | Refills: 11 | Status: SHIPPED | OUTPATIENT
Start: 2024-09-30

## 2024-09-30 NOTE — TELEPHONE ENCOUNTER
Pt left [employer] message stating that Advair HFA is within his price range and asked for us to send in a year's supply. This has been sent to [facility].

## 2025-08-22 ENCOUNTER — OFFICE VISIT (OUTPATIENT)
Age: 65
End: 2025-08-22
Payer: COMMERCIAL

## 2025-08-22 VITALS
TEMPERATURE: 98 F | HEART RATE: 57 BPM | BODY MASS INDEX: 30.79 KG/M2 | WEIGHT: 215.1 LBS | SYSTOLIC BLOOD PRESSURE: 152 MMHG | OXYGEN SATURATION: 96 % | DIASTOLIC BLOOD PRESSURE: 80 MMHG | HEIGHT: 70 IN

## 2025-08-22 DIAGNOSIS — J45.20 MILD INTERMITTENT ASTHMA, UNSPECIFIED WHETHER COMPLICATED: Primary | ICD-10-CM

## 2025-08-22 DIAGNOSIS — J45.20 MILD INTERMITTENT ASTHMA WITHOUT COMPLICATION: ICD-10-CM

## 2025-08-22 RX ORDER — FLUTICASONE PROPIONATE AND SALMETEROL XINAFOATE 45; 21 UG/1; UG/1
2 AEROSOL, METERED RESPIRATORY (INHALATION)
Qty: 12 G | Refills: 11 | Status: SHIPPED | OUTPATIENT
Start: 2025-08-22